# Patient Record
(demographics unavailable — no encounter records)

---

## 2024-11-18 NOTE — HEALTH RISK ASSESSMENT
[Never] : Never [Patient reported mammogram was normal] : Patient reported mammogram was normal [Patient reported bone density results were normal] : Patient reported bone density results were normal [Patient reported colonoscopy was normal] : Patient reported colonoscopy was normal [HIV test declined] : HIV test declined [Hepatitis C test declined] : Hepatitis C test declined [Good] : ~his/her~  mood as  good [Any fall with injury in past year] : Patient reported fall with injury in the past year [0] : 2) Feeling down, depressed, or hopeless: Not at all (0) [PHQ-2 Negative - No further assessment needed] : PHQ-2 Negative - No further assessment needed [Alone] : lives alone [Employed] : employed [Graduate School] : graduate school [] :  [No] : In the past 12 months have you used drugs other than those required for medical reasons? No [Patient reported PAP Smear was normal] : Patient reported PAP Smear was normal [None] : None [Fully functional (bathing, dressing, toileting, transferring, walking, feeding)] : Fully functional (bathing, dressing, toileting, transferring, walking, feeding) [Fully functional (using the telephone, shopping, preparing meals, housekeeping, doing laundry, using] : Fully functional and needs no help or supervision to perform IADLs (using the telephone, shopping, preparing meals, housekeeping, doing laundry, using transportation, managing medications and managing finances) [Reports normal functional visual acuity (ie: able to read med bottle)] : Reports normal functional visual acuity [de-identified] : None, will be  going to PT [de-identified] : eating better  [[id number]] : 0 [Reports changes in hearing] : Reports no changes in hearing [Reports changes in vision] : Reports no changes in vision [Reports changes in dental health] : Reports no changes in dental health [MammogramDate] : 12/22 [PapSmearDate] : 09/23 [BoneDensityDate] : 12/22 [ColonoscopyDate] : 01/21

## 2024-11-18 NOTE — ASSESSMENT
[FreeTextEntry1] : 73-year-old female presented for an annual physical exam.  routine blood work today, blood collected in the office. up to date with screening EKG today flu and rsv vaccine today referred for mammogram  will call back with results.

## 2024-11-18 NOTE — HISTORY OF PRESENT ILLNESS
[FreeTextEntry1] : Annual  [de-identified] : 73-year-old female with pmh of HLD, hypothyroidism, anxiety presenting for an annual physical exam.  Patient had labs done with [facility] few days ago.  Pt had right sided pain, seen by surgeon to r/o gallbladder disease and it was ruled out, has an ultrasound scheduled.  Left knee pain, saw ortho and recommended PT which she's starting soon.   General Surgeon: Dr. Villa Orthopedist: JOSE Miller Psychiatry NP: Snehal Rodriguez

## 2024-11-18 NOTE — ADDENDUM
[FreeTextEntry1] : Patient present for influenza vaccination as ordered by .Prior to administration, reviewed influenza VIS with patient who verbalized understanding and consent.   Pt denies previous allergic reactions and Guillain Laporte syndrome.   MANU: [employer] NDC [id number] LOT: [id number] EXP:06/01/2025  Patient tolerated vaccination well in right deltoid. No immediate adverse reaction noted.   VIS sent home with patient as per protocol.     Patient presented for RSV vaccine (Abrysvo) vaccination as ordered by . Prior to administration, reviewed Abrysvo VIS with patient who verbalized understanding.     Patient denied severe allergic reaction to previous vaccinations.  Manufacture: Pfizer NDC:[id number] Exp: 06/01/2025 Lot:[id number]   Patient tolerated vaccination well on Left  Deltoid. No immediate adverse reaction noted. Patient provided with VIS for home review as per protocol.   Gina Apodaca RN

## 2025-01-29 NOTE — HISTORY OF PRESENT ILLNESS
[No Pertinent Cardiac History] : no history of aortic stenosis, atrial fibrillation, coronary artery disease, recent myocardial infarction, or implantable device/pacemaker [No Pertinent Pulmonary History] : no history of asthma, COPD, sleep apnea, or smoking [No Adverse Anesthesia Reaction] : no adverse anesthesia reaction in self or family member [(Patient denies any chest pain, claudication, dyspnea on exertion, orthopnea, palpitations or syncope)] : Patient denies any chest pain, claudication, dyspnea on exertion, orthopnea, palpitations or syncope [Good (7-10 METs)] : Good (7-10 METs) [Chronic Anticoagulation] : no chronic anticoagulation [Chronic Kidney Disease] : no chronic kidney disease [Diabetes] : no diabetes [FreeTextEntry1] : Cataract surgery [FreeTextEntry2] : 02/03/2025 [FreeTextEntry3] : Dr. Paddy Durbin  [FreeTextEntry4] : 74-year-old female with pmh of HLD, hypothyroidism, anxiety presenting for preop clearance for cataract surgery. Patient denies any fever, chills, chest pain, sob or any other symptoms.